# Patient Record
Sex: MALE | Race: BLACK OR AFRICAN AMERICAN | URBAN - METROPOLITAN AREA
[De-identification: names, ages, dates, MRNs, and addresses within clinical notes are randomized per-mention and may not be internally consistent; named-entity substitution may affect disease eponyms.]

---

## 2020-07-09 ENCOUNTER — TELEPHONE (OUTPATIENT)
Dept: SURGERY | Facility: CLINIC | Age: 45
End: 2020-07-09

## 2020-07-09 NOTE — TELEPHONE ENCOUNTER
Returned call to Sa Doan who is RN with patients PCP. Informed her Dr. Shaheen Ravi out of the office until 7-14-20, patient has not been seen here at [address] [place] [address] and not scheduled for surgery with GL.       DENISE

## 2020-07-09 NOTE — TELEPHONE ENCOUNTER
Per Lidia with patient's primary care needs to know if hernia band needs to be removed and when his surgery will be.